# Patient Record
Sex: MALE | Race: WHITE | NOT HISPANIC OR LATINO | URBAN - METROPOLITAN AREA
[De-identification: names, ages, dates, MRNs, and addresses within clinical notes are randomized per-mention and may not be internally consistent; named-entity substitution may affect disease eponyms.]

---

## 2023-08-07 ENCOUNTER — EMERGENCY (EMERGENCY)
Facility: HOSPITAL | Age: 28
LOS: 1 days | Discharge: HOME | End: 2023-08-07
Admitting: EMERGENCY MEDICINE
Payer: COMMERCIAL

## 2023-08-07 VITALS
OXYGEN SATURATION: 98 % | SYSTOLIC BLOOD PRESSURE: 116 MMHG | DIASTOLIC BLOOD PRESSURE: 77 MMHG | RESPIRATION RATE: 16 BRPM | HEART RATE: 66 BPM | WEIGHT: 134.92 LBS | TEMPERATURE: 98 F | HEIGHT: 67 IN

## 2023-08-07 VITALS
SYSTOLIC BLOOD PRESSURE: 122 MMHG | HEART RATE: 68 BPM | OXYGEN SATURATION: 99 % | DIASTOLIC BLOOD PRESSURE: 86 MMHG | RESPIRATION RATE: 18 BRPM

## 2023-08-07 DIAGNOSIS — M79.18 MYALGIA, OTHER SITE: ICD-10-CM

## 2023-08-07 DIAGNOSIS — Z98.890 OTHER SPECIFIED POSTPROCEDURAL STATES: ICD-10-CM

## 2023-08-07 DIAGNOSIS — L02.31 CUTANEOUS ABSCESS OF BUTTOCK: ICD-10-CM

## 2023-08-07 LAB
ALBUMIN SERPL ELPH-MCNC: 4.4 G/DL — SIGNIFICANT CHANGE UP (ref 3.4–5)
ALP SERPL-CCNC: 74 U/L — SIGNIFICANT CHANGE UP (ref 40–120)
ALT FLD-CCNC: 24 U/L — SIGNIFICANT CHANGE UP (ref 12–42)
ANION GAP SERPL CALC-SCNC: 11 MMOL/L — SIGNIFICANT CHANGE UP (ref 9–16)
APPEARANCE UR: CLEAR — SIGNIFICANT CHANGE UP
AST SERPL-CCNC: 24 U/L — SIGNIFICANT CHANGE UP (ref 15–37)
BASOPHILS # BLD AUTO: 0.07 K/UL — SIGNIFICANT CHANGE UP (ref 0–0.2)
BASOPHILS NFR BLD AUTO: 0.6 % — SIGNIFICANT CHANGE UP (ref 0–2)
BILIRUB SERPL-MCNC: 1.7 MG/DL — HIGH (ref 0.2–1.2)
BILIRUB UR-MCNC: NEGATIVE — SIGNIFICANT CHANGE UP
BUN SERPL-MCNC: 16 MG/DL — SIGNIFICANT CHANGE UP (ref 7–23)
CALCIUM SERPL-MCNC: 9.1 MG/DL — SIGNIFICANT CHANGE UP (ref 8.5–10.5)
CHLORIDE SERPL-SCNC: 102 MMOL/L — SIGNIFICANT CHANGE UP (ref 96–108)
CO2 SERPL-SCNC: 25 MMOL/L — SIGNIFICANT CHANGE UP (ref 22–31)
COLOR SPEC: YELLOW — SIGNIFICANT CHANGE UP
CREAT SERPL-MCNC: 0.85 MG/DL — SIGNIFICANT CHANGE UP (ref 0.5–1.3)
DIFF PNL FLD: NEGATIVE — SIGNIFICANT CHANGE UP
EGFR: 121 ML/MIN/1.73M2 — SIGNIFICANT CHANGE UP
EOSINOPHIL # BLD AUTO: 0.06 K/UL — SIGNIFICANT CHANGE UP (ref 0–0.5)
EOSINOPHIL NFR BLD AUTO: 0.5 % — SIGNIFICANT CHANGE UP (ref 0–6)
GLUCOSE SERPL-MCNC: 95 MG/DL — SIGNIFICANT CHANGE UP (ref 70–99)
GLUCOSE UR QL: NEGATIVE MG/DL — SIGNIFICANT CHANGE UP
HCT VFR BLD CALC: 47.4 % — SIGNIFICANT CHANGE UP (ref 39–50)
HGB BLD-MCNC: 16.6 G/DL — SIGNIFICANT CHANGE UP (ref 13–17)
IMM GRANULOCYTES NFR BLD AUTO: 0.3 % — SIGNIFICANT CHANGE UP (ref 0–0.9)
KETONES UR-MCNC: ABNORMAL MG/DL
LACTATE BLDV-MCNC: 1.4 MMOL/L — SIGNIFICANT CHANGE UP (ref 0.5–2)
LACTATE BLDV-MCNC: 2.2 MMOL/L — HIGH (ref 0.5–2)
LEUKOCYTE ESTERASE UR-ACNC: NEGATIVE — SIGNIFICANT CHANGE UP
LYMPHOCYTES # BLD AUTO: 19.2 % — SIGNIFICANT CHANGE UP (ref 13–44)
LYMPHOCYTES # BLD AUTO: 2.27 K/UL — SIGNIFICANT CHANGE UP (ref 1–3.3)
MCHC RBC-ENTMCNC: 32.3 PG — SIGNIFICANT CHANGE UP (ref 27–34)
MCHC RBC-ENTMCNC: 35 GM/DL — SIGNIFICANT CHANGE UP (ref 32–36)
MCV RBC AUTO: 92.2 FL — SIGNIFICANT CHANGE UP (ref 80–100)
MONOCYTES # BLD AUTO: 0.9 K/UL — SIGNIFICANT CHANGE UP (ref 0–0.9)
MONOCYTES NFR BLD AUTO: 7.6 % — SIGNIFICANT CHANGE UP (ref 2–14)
NEUTROPHILS # BLD AUTO: 8.51 K/UL — HIGH (ref 1.8–7.4)
NEUTROPHILS NFR BLD AUTO: 71.8 % — SIGNIFICANT CHANGE UP (ref 43–77)
NITRITE UR-MCNC: NEGATIVE — SIGNIFICANT CHANGE UP
NRBC # BLD: 0 /100 WBCS — SIGNIFICANT CHANGE UP (ref 0–0)
PH UR: 8.5 (ref 5–8)
PLATELET # BLD AUTO: 224 K/UL — SIGNIFICANT CHANGE UP (ref 150–400)
POTASSIUM SERPL-MCNC: 3.8 MMOL/L — SIGNIFICANT CHANGE UP (ref 3.5–5.3)
POTASSIUM SERPL-SCNC: 3.8 MMOL/L — SIGNIFICANT CHANGE UP (ref 3.5–5.3)
PROT SERPL-MCNC: 7.6 G/DL — SIGNIFICANT CHANGE UP (ref 6.4–8.2)
PROT UR-MCNC: NEGATIVE MG/DL — SIGNIFICANT CHANGE UP
RBC # BLD: 5.14 M/UL — SIGNIFICANT CHANGE UP (ref 4.2–5.8)
RBC # FLD: 12.5 % — SIGNIFICANT CHANGE UP (ref 10.3–14.5)
SODIUM SERPL-SCNC: 138 MMOL/L — SIGNIFICANT CHANGE UP (ref 132–145)
SP GR SPEC: 1.01 — SIGNIFICANT CHANGE UP (ref 1–1.03)
UROBILINOGEN FLD QL: 1 MG/DL — SIGNIFICANT CHANGE UP (ref 0.2–1)
WBC # BLD: 11.85 K/UL — HIGH (ref 3.8–10.5)
WBC # FLD AUTO: 11.85 K/UL — HIGH (ref 3.8–10.5)

## 2023-08-07 PROCEDURE — 99285 EMERGENCY DEPT VISIT HI MDM: CPT | Mod: 25

## 2023-08-07 PROCEDURE — 10061 I&D ABSCESS COMP/MULTIPLE: CPT

## 2023-08-07 RX ORDER — CEPHALEXIN 500 MG
1 CAPSULE ORAL
Qty: 56 | Refills: 0
Start: 2023-08-07 | End: 2023-08-20

## 2023-08-07 RX ORDER — AMPICILLIN SODIUM AND SULBACTAM SODIUM 250; 125 MG/ML; MG/ML
3 INJECTION, POWDER, FOR SUSPENSION INTRAMUSCULAR; INTRAVENOUS ONCE
Refills: 0 | Status: COMPLETED | OUTPATIENT
Start: 2023-08-07 | End: 2023-08-07

## 2023-08-07 RX ORDER — SODIUM CHLORIDE 9 MG/ML
1000 INJECTION INTRAMUSCULAR; INTRAVENOUS; SUBCUTANEOUS ONCE
Refills: 0 | Status: COMPLETED | OUTPATIENT
Start: 2023-08-07 | End: 2023-08-07

## 2023-08-07 RX ORDER — ACETAMINOPHEN 500 MG
975 TABLET ORAL ONCE
Refills: 0 | Status: COMPLETED | OUTPATIENT
Start: 2023-08-07 | End: 2023-08-07

## 2023-08-07 RX ORDER — KETOROLAC TROMETHAMINE 30 MG/ML
15 SYRINGE (ML) INJECTION ONCE
Refills: 0 | Status: DISCONTINUED | OUTPATIENT
Start: 2023-08-07 | End: 2023-08-07

## 2023-08-07 RX ORDER — VANCOMYCIN HCL 1 G
1000 VIAL (EA) INTRAVENOUS ONCE
Refills: 0 | Status: COMPLETED | OUTPATIENT
Start: 2023-08-07 | End: 2023-08-07

## 2023-08-07 RX ADMIN — AMPICILLIN SODIUM AND SULBACTAM SODIUM 3 GRAM(S): 250; 125 INJECTION, POWDER, FOR SUSPENSION INTRAMUSCULAR; INTRAVENOUS at 12:47

## 2023-08-07 RX ADMIN — SODIUM CHLORIDE 1000 MILLILITER(S): 9 INJECTION INTRAMUSCULAR; INTRAVENOUS; SUBCUTANEOUS at 10:39

## 2023-08-07 RX ADMIN — Medication 250 MILLIGRAM(S): at 10:26

## 2023-08-07 RX ADMIN — SODIUM CHLORIDE 2000 MILLILITER(S): 9 INJECTION INTRAMUSCULAR; INTRAVENOUS; SUBCUTANEOUS at 09:24

## 2023-08-07 RX ADMIN — Medication 975 MILLIGRAM(S): at 12:41

## 2023-08-07 RX ADMIN — Medication 15 MILLIGRAM(S): at 12:47

## 2023-08-07 RX ADMIN — Medication 15 MILLIGRAM(S): at 09:24

## 2023-08-07 RX ADMIN — Medication 1000 MILLIGRAM(S): at 11:30

## 2023-08-07 RX ADMIN — Medication 975 MILLIGRAM(S): at 12:47

## 2023-08-07 RX ADMIN — SODIUM CHLORIDE 1000 MILLILITER(S): 9 INJECTION INTRAMUSCULAR; INTRAVENOUS; SUBCUTANEOUS at 12:47

## 2023-08-07 RX ADMIN — AMPICILLIN SODIUM AND SULBACTAM SODIUM 200 GRAM(S): 250; 125 INJECTION, POWDER, FOR SUSPENSION INTRAMUSCULAR; INTRAVENOUS at 09:29

## 2023-08-07 NOTE — ED ADULT NURSE NOTE - NSFALLUNIVINTERV_ED_ALL_ED
Bed/Stretcher in lowest position, wheels locked, appropriate side rails in place/Call bell, personal items and telephone in reach/Instruct patient to call for assistance before getting out of bed/chair/stretcher/Non-slip footwear applied when patient is off stretcher/[place] to call system/Physically safe environment - no spills, clutter or unnecessary equipment/Purposeful proactive rounding/Room/bathroom lighting operational, light cord in reach

## 2023-08-07 NOTE — ED ADULT NURSE NOTE - OBJECTIVE STATEMENT
Pt to ED for buttock pain x2 days. Redness and swelling to area, pt rating 10/10 pain. Unable to sit on bottom due to pain.

## 2023-08-07 NOTE — ED PROVIDER NOTE - CLINICAL SUMMARY MEDICAL DECISION MAKING FREE TEXT BOX
Patient presents with pain over buttock x one day. on exam well appearing, neurovascularly intact, with abscess over gluteal cleft. will check labs, do I&D, give abx and continue to monitor

## 2023-08-07 NOTE — ED PROCEDURE NOTE - CPROC ED INFORMED CONSENT1
Benefits, risks, and possible complications of procedure explained to patient/caregiver who verbalized understanding and gave verbal consent. Size Of Lesion In Cm: 0.2

## 2023-08-07 NOTE — ED PROVIDER NOTE - OBJECTIVE STATEMENT
PMHX lumbar spine surgery, on truvada for prep presents with buttock pain x 24hrs. denies fever, chills, nightsweats, hx of cellulitis in the past. states that he is an avid biking and thought that symptoms were due to irritation. denies pain on BM. last BM this morning.

## 2023-08-07 NOTE — ED PROVIDER NOTE - PATIENT PORTAL LINK FT
You can access the FollowMyHealth Patient Portal offered by [facility] by registering at the following website: [email or web address]. By joining [employer]’s FollowMyHealth portal, you will also be able to view your health information using other applications (apps) compatible with our system.

## 2023-08-07 NOTE — ED PROVIDER NOTE - NSFOLLOWUPINSTRUCTIONS_ED_ALL_ED_FT
Cellulitis    Cellulitis is a skin infection caused by bacteria. This condition occurs most often in the arms and lower legs but can occur anywhere over the body. Symptoms include redness, swelling, warm skin, tenderness, and chills/fever. If you were prescribed an antibiotic medicine, take it as told by your health care provider. Do not stop taking the antibiotic even if you start to feel better.    SEEK IMMEDIATE MEDICAL CARE IF YOU HAVE ANY OF THE FOLLOWING SYMPTOMS: worsening fever, red streaks coming from affected area, vomiting or diarrhea, or dizziness/lightheadedness.  Abscess    An abscess is an infected area that contains a collection of pus and debris. It can occur in almost any part of the body and occurs when the tissue gets infection. Symptoms include a painful mass that is red, warm, tender that might break open and HAVE drainage. If your health care provider gave you antibiotics make sure to take the full course and do not stop even if feeling better.     SEEK IMMEDIATE MEDICAL CARE IF YOU HAVE ANY OF THE FOLLOWING SYMPTOMS: chills, fever, muscle aches, or red streaking from the area.

## 2023-08-07 NOTE — ED PROCEDURE NOTE - CPROC ED POST PROC CARE GUIDE1
10-Nov-2021 21:26
Verbal/written post procedure instructions were given to patient/caregiver./Instructed patient/caregiver to follow-up with primary care physician./Instructed patient/caregiver regarding signs and symptoms of infection./Keep the cast/splint/dressing clean and dry.

## 2023-08-09 LAB
CULTURE RESULTS: NO GROWTH — SIGNIFICANT CHANGE UP
CULTURE RESULTS: SIGNIFICANT CHANGE UP
SPECIMEN SOURCE: SIGNIFICANT CHANGE UP
SPECIMEN SOURCE: SIGNIFICANT CHANGE UP

## 2023-08-12 LAB
CULTURE RESULTS: SIGNIFICANT CHANGE UP
CULTURE RESULTS: SIGNIFICANT CHANGE UP
SPECIMEN SOURCE: SIGNIFICANT CHANGE UP
SPECIMEN SOURCE: SIGNIFICANT CHANGE UP

## 2024-07-23 ENCOUNTER — EMERGENCY (EMERGENCY)
Facility: HOSPITAL | Age: 29
LOS: 1 days | Discharge: HOME | End: 2024-07-23
Attending: EMERGENCY MEDICINE | Admitting: EMERGENCY MEDICINE
Payer: COMMERCIAL

## 2024-07-23 VITALS
SYSTOLIC BLOOD PRESSURE: 158 MMHG | OXYGEN SATURATION: 100 % | DIASTOLIC BLOOD PRESSURE: 93 MMHG | HEIGHT: 68 IN | HEART RATE: 98 BPM | RESPIRATION RATE: 20 BRPM | WEIGHT: 154.98 LBS

## 2024-07-23 DIAGNOSIS — L05.91 PILONIDAL CYST WITHOUT ABSCESS: ICD-10-CM

## 2024-07-23 PROCEDURE — 10081 I&D PILONIDAL CYST COMP: CPT

## 2024-07-23 PROCEDURE — 99284 EMERGENCY DEPT VISIT MOD MDM: CPT | Mod: 25

## 2024-07-23 RX ORDER — SULFAMETHOXAZOLE AND TRIMETHOPRIM 800; 160 MG/1; MG/1
1 TABLET ORAL
Qty: 14 | Refills: 0
Start: 2024-07-23 | End: 2024-07-29

## 2024-07-23 RX ORDER — SULFAMETHOXAZOLE AND TRIMETHOPRIM 800; 160 MG/1; MG/1
1 TABLET ORAL ONCE
Refills: 0 | Status: COMPLETED | OUTPATIENT
Start: 2024-07-23 | End: 2024-07-23

## 2024-07-23 RX ORDER — CEPHALEXIN 500 MG
1 CAPSULE ORAL
Qty: 28 | Refills: 0
Start: 2024-07-23 | End: 2024-07-29

## 2024-07-23 RX ORDER — CEPHALEXIN 500 MG
500 CAPSULE ORAL ONCE
Refills: 0 | Status: COMPLETED | OUTPATIENT
Start: 2024-07-23 | End: 2024-07-23

## 2024-07-23 RX ADMIN — Medication 500 MILLIGRAM(S): at 18:32

## 2024-07-23 RX ADMIN — SULFAMETHOXAZOLE AND TRIMETHOPRIM 1 TABLET(S): 800; 160 TABLET ORAL at 18:32

## 2024-07-23 RX ADMIN — Medication 600 MILLIGRAM(S): at 18:32

## 2024-07-23 NOTE — ED PROVIDER NOTE - CARE PROVIDER_API CALL
Rosaline Rene  Surgery  [address], [address]  [place] [address]  Phone: [phone]  Fax: [phone]  Follow Up Time:     Feliz Martins  Colon/Rectal Surgery  [address] [facility]  [place] [address]  Phone: [phone]  Fax: [phone]  Follow Up Time:     Benitez Cadena  Surgery  [address]  [place] [address]  Phone: [phone]  Fax: [phone]  Follow Up Time:    No

## 2024-07-23 NOTE — ED PROVIDER NOTE - PHYSICAL EXAMINATION
well healed scar at top of buttock crease on left, moderate tenderess and fullness to palpation where scar is  no anal/perianal tender or redness  Arsenio buenrostro as chaperone

## 2024-07-23 NOTE — ED PROVIDER NOTE - OBJECTIVE STATEMENT
Subjective:  - Summary : The patient visited me due to a lump and pain that he had been feeling at the same location where he had a pilonidal cyst a year ago. The patient is worried that the cyst has returned.  - Chief Complaint (CC) : Concerns about a possible recurrence of pilonidal cyst.  - History of Present Illness (HPI) : The patient had a pilonidal cyst that required incision and drainage exactly a year ago. He now presents with the feeling of a lump in the same spot of previous cyst. The area is not inflamed but he is experiencing pain similar to what he felt last year.  - Past Medical History : The patient has a history of a pilonidal cyst that was treated with an incision and drainage. He has also had L4, L5 spinal surgery, and knee surgery. He reports history of sciatica and scoliosis as well.  - Past Surgical History : In the past, the patient underwent L4, L5 spinal surgery, knee surgery, and incision and drainage of a pilonidal cyst.  - Family History : Family history was not reviewed during this visit.  - Social History : The patient does not smoke.  - Review of Systems : Not fully explored during the visit. The patient denied any respiratory issues.  - Medications : No regular medications reported by the patient.  - Allergies : No known allergies were reported by the patient.  Objective:  - Diagnostic Results : None discussed during the visit.  - Vital Signs : Not mentioned during visit.  - Physical Examination (PE) : Upon examination, the area the patient identified as painful is found to be minimally inflamed.  Assessment:  - Summary : Given the similar location and symptoms to the patient's previous pilonidal cyst, there is a possibility that the cyst has recurred.  - Problems :  - Possible recurrence of pilonidal cyst    - Differential Diagnosis :  - Pilonidal cyst recurrence    - Skin abscess    - Infected ingrown hair    - Furuncle or Carbuncle    Plan:  - Summary : The patient was offered different options, including administration of antibiotics, a minor incision with potential drainage of the area, or a referral to a surgeon for potential surgical intervention.  - Plan :  - Prescribe antibiotics    - Possible minor incision and drainage    - Make an appointment for the patient to see a surgeon in the next couple of weeks    - Advise the patient to return if the condition does not improve or worsens

## 2024-07-23 NOTE — ED PROVIDER NOTE - NSFOLLOWUPINSTRUCTIONS_ED_ALL_ED_FT
Follow up here in the ER in 1-2 days for dressing change and packing removal.  Take the antibiotics as prescribed.  Make an appointment with the surgeon to evaluate the cyst area for definitive management.  Ibuprofen 600mg every 6 hours as needed for soreness or pain.  Return at any time if you have any concerns.      No work for three days.    Pilonidal Cyst Drainage, Care After  The following information offers guidance on how to care for yourself after your procedure. Your health care provider may also give you more specific instructions. If you have problems or questions, contact your health care provider.    What can I expect after the procedure?  After the procedure, it is common to have:  Pain that gets better when you take medicine.  Some fluid or blood coming from your wound.  Follow these instructions at home:  Medicines    Take over-the-counter and prescription medicines only as told by your health care provider.  If you were prescribed antibiotics, take them as told by your health care provider. Do not stop using the antibiotic even if you start to feel better.  Ask your health care provider if the medicine prescribed to you:  Requires you to avoid driving or using machinery.  Can cause constipation. You may need to take these actions to prevent or treat constipation:  Drink enough fluid to keep your urine pale yellow.  Take over-the-counter or prescription medicines.  Eat foods that are high in fiber, such as beans, whole grains, and fresh fruits and vegetables.  Limit foods that are high in fat and processed sugars, such as fried or sweet foods.  Bathing    Do not take baths or showers, swim, or use a hot tub until your health care provider approves. You may only be allowed to take sponge baths.  When you can return to bathing will depend on the type of wound that you have.  If your wound was packed with a germ-free packing material, keep the area dry until your packing has been removed. After the packing has been removed, you may start taking showers when your health care provider approves.  If the edges of the incision around your wound were stitched to your skin (marsupialization), you may start taking showers the day after surgery, or when your health care provider approves. Let the water from the shower moisten your bandage (dressing). This will make it easier to take off. Remove your dressing before you shower.  While bathing, clean your buttocks area gently with soap and water.  After bathing:  Pat the area dry with a soft, clean towel.  If directed, cover the area with a clean dressing.  Wound care    Two stitched wounds. One is normal. The other is red with pus and infected.  You may need to have a caregiver help you with wound care and dressing changes.  Follow instructions from your health care provider about how to take care of your wound. Make sure you:  Wash your hands with soap and water for at least 20 seconds before and after you change your dressing. If soap and water are not available, use hand .  Change your dressing as told by your health care provider.  Leave stitches (sutures), skin glue, or adhesive strips in place. These skin closures may need to stay in place for 2 weeks or longer. If adhesive strip edges start to loosen and curl up, you may trim the loose edges. Do not remove adhesive strips completely unless your health care provider tells you to do that.  Check your wound every day for signs of infection. Check for:  Redness, swelling, or more pain.  More fluid or blood.  Warmth.  Pus or a bad smell.  Follow any additional instructions from your health care provider on how to care for your wound, such as wound cleaning, wound flushing (irrigation), or packing your wound with a dressing.  If you had marsupialization, ask your health care provider when you can stop using a dressing.  Lifestyle    Do not do activities that irritate or put pressure on your buttocks for about 2 weeks, or as long as told by your health care provider. These activities include bike riding, running, and anything that involves a twisting motion.  Rest as told by your health care provider.  Do not sit for a long time without moving. Get up to take short walks every 1–2 hours. This will improve blood flow and breathing. Ask for help if you feel weak or unsteady.  Sleep on your side instead of your back.  Avoid wearing tight underwear and tight pants.  Return to your normal activities as told by your health care provider. Ask your health care provider what activities are safe for you.  General instructions    Do not use any products that contain nicotine or tobacco. These products include cigarettes, chewing tobacco, and vaping devices, such as e-cigarettes. These can delay wound healing after surgery. If you need help quitting, ask your health care provider.  Keep all follow-up visits. This is important to monitor healing. If you had an incision and drainage procedure with wound packing, your packing may be changed or removed at follow-up visits.  Contact a health care provider if:  You have pain that does not get better with medicine.  You have any of these signs of infection:  More redness, swelling, or pain around your incision.  More fluid or blood coming from your incision.  Warmth coming from your incision.  Pus or a bad smell coming from your incision.  A fever.  You have muscle aches.  You feel generally sick.  You are dizzy.  Summary  A pilonidal cyst is a fluid-filled sac that forms under the skin near the tailbone. It is common to have some fluid or blood coming from your wound after a procedure to drain a pilonidal cyst.  If you were prescribed antibiotics, take them as told by your health care provider. Do not stop taking the antibiotic even if you start to feel better.  You may need to have a caregiver help you with wound care and dressing changes.  Return to your health care provider as instructed to have any packing material changed or removed.

## 2024-07-23 NOTE — ED PROVIDER NOTE - PROVIDER TOKENS
PROVIDER:[TOKEN:[[id number]]],PROVIDER:[TOKEN:[[id number]]],PROVIDER:[TOKEN:[[id number]]]

## 2024-07-23 NOTE — ED PROVIDER NOTE - PATIENT PORTAL LINK FT
You can access the FollowMyHealth Patient Portal offered by [facility] by registering at the following website: [email or web address]. By joining [employer]’s FollowMyHealth portal, you will also be able to view your health information using other applications (apps) compatible with our system.

## 2024-07-23 NOTE — ED PROVIDER NOTE - CARE PROVIDERS DIRECT ADDRESSES
[email or web address]

## 2024-07-25 ENCOUNTER — EMERGENCY (EMERGENCY)
Age: 29
LOS: 1 days | Discharge: HOME | End: 2024-07-25
Attending: EMERGENCY MEDICINE | Admitting: EMERGENCY MEDICINE
Payer: COMMERCIAL

## 2024-07-25 VITALS
RESPIRATION RATE: 15 BRPM | TEMPERATURE: 98 F | OXYGEN SATURATION: 99 % | HEIGHT: 68 IN | HEART RATE: 61 BPM | DIASTOLIC BLOOD PRESSURE: 83 MMHG | SYSTOLIC BLOOD PRESSURE: 126 MMHG

## 2024-07-25 DIAGNOSIS — L05.91 PILONIDAL CYST WITHOUT ABSCESS: ICD-10-CM

## 2024-07-25 PROBLEM — Z00.00 ENCOUNTER FOR PREVENTIVE HEALTH EXAMINATION: Status: ACTIVE | Noted: 2024-07-25

## 2024-07-25 PROCEDURE — L9995: CPT

## 2024-07-26 ENCOUNTER — NON-APPOINTMENT (OUTPATIENT)
Age: 29
End: 2024-07-26

## 2024-07-30 ENCOUNTER — APPOINTMENT (OUTPATIENT)
Dept: COLORECTAL SURGERY | Facility: CLINIC | Age: 29
End: 2024-07-30
Payer: COMMERCIAL

## 2024-07-30 ENCOUNTER — NON-APPOINTMENT (OUTPATIENT)
Age: 29
End: 2024-07-30

## 2024-07-30 VITALS
TEMPERATURE: 97.8 F | BODY MASS INDEX: 20.46 KG/M2 | DIASTOLIC BLOOD PRESSURE: 70 MMHG | WEIGHT: 135 LBS | HEIGHT: 68 IN | SYSTOLIC BLOOD PRESSURE: 111 MMHG | HEART RATE: 57 BPM

## 2024-07-30 DIAGNOSIS — Z78.9 OTHER SPECIFIED HEALTH STATUS: ICD-10-CM

## 2024-07-30 DIAGNOSIS — L05.91 PILONIDAL CYST W/OUT ABSCESS: ICD-10-CM

## 2024-07-30 PROCEDURE — 99203 OFFICE O/P NEW LOW 30 MIN: CPT | Mod: 57

## 2024-07-30 NOTE — PHYSICAL EXAM
[Abdomen Tenderness] : ~T No ~M abdominal tenderness [JVD] : no jugular venous distention  [Normal Breath Sounds] : Normal breath sounds [Alert] : alert [Calm] : calm [de-identified] : Well appearing male in NAD [de-identified] : MMM [de-identified] : ROM WNL [FreeTextEntry1] : The pt was examined in the left lateral decubitus position with a medical assistant present for the entirety of the examination. Visual examination of the intergluteal cleft with effacement of the buttocks revealed a midline superior chronic noninflamed pit without hair emanating from it, and associated left superior medial surgical drainage site that was well healing roughly 2cm superior to the pit without evidence of active infection. Otherwise no masses, ulcerations, fissures or skin rashes.  The patient tolerated the exam well.

## 2024-07-30 NOTE — PLAN
.  History and Physical Exam    PCP: Ty Gilliland MD     Source of Information: chart review  Living: senior residence    HISTORY     Chief Complaint: Santa Rosales is a 66year old male presenting with PMHx of 3-vessel CAD s/p stent placement (RCA, LAD in Nov/Dec 2019), GERD, prostate cancer, and dementia presents as a transfer from [address] [place] [address] due to insurance coverage issues. History is solely based on chart review. Patient not comprehending questions. Patient admitted on 11/7/2020 to [facility] after being found in his senior building after 3 days of not being heard from. [employer] broke into his room and the patient was found in his own feces and urine. There were no signs of trauma, alcohol or recreational drug use. He was found in a very confused state, unable to answer any questions. Upon arrival in the ED at [address] [place] [address] patient was afebrile, normotensive and maintaining saturations on room air. He was only oriented to self. He could not answer any questions. Extensive lab work-up was done for acute confusional state, did not reveal any cause for AMS. Patient treated with vancomycin and ceftriaxone as WBC were elevated. At that time troponin was also found to be elevated at 0.11 and down trended to 0.08. NM scan was done which showed reversible inferior infarct, no wall motion abnormalities. Of note patient was previously hospitalized here in October for altered mental status, at that time work-up was done changing and change in mental status attributed to worsening dementia.     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   â¢ Arthritis    â¢ Atypical chest pain 12/20/2018   â¢ Colon cancer screening 1/29/2019   â¢ Constipation 10/20/2017   â¢ Diarrhea 4/23/2019   â¢ Frequency of urination 7/27/2019   â¢ Gout    â¢ Hypothyroidism 7/23/2013   â¢ Male erectile disorder 4/7/2010   â¢ Malignant neoplasm of prostate (CMS/HCC) 5/5/2014   â¢ Nose discharge 3/26/2019   â¢ Prostate cancer (CMS/Formerly [facility])    â¢ Prostate cancer (CMS/Formerly [facility])    â¢ Recurrent major depressive disorder (CMS/Formerly [facility]) 12/20/2018   â¢ Urinary tract infection without hematuria 4/23/2019   â¢ Ventricular tachycardia (CMS/[facility]ly [facility]) 1/23/2019        Surgical History  Past Surgical History:   Procedure Laterality Date   â¢ Cardiac catherization     â¢ Knee surgery     â¢ No past surgeries          Social History  Social History     Tobacco Use   â¢ Smoking status: Former Smoker     Packs/day: 0.00   â¢ Smokeless tobacco: Former User   â¢ Tobacco comment: not sure when he quit, started smoking when he was 13, 0.5 pack a day   Substance Use Topics   â¢ Alcohol use: No     Frequency: Never   â¢ Drug use: No       Family History  No family history on file. Allergies  ALLERGIES:  Lisinopril    Home Meds  Medications Prior to Admission   Medication Sig Dispense Refill   â¢ celecoxib (CeleBREX) 100 MG capsule Take 100 mg by mouth daily. â¢ mirtazapine (REMERON) 15 MG tablet Take 15 mg by mouth nightly. â¢ isosorbide mononitrate (IMDUR) 60 MG 24 hr tablet Take 2 tablets by mouth daily. (Patient taking differently: Take 60 mg by mouth 2 times daily. ) 90 tablet 3   â¢ atorvastatin (LIPITOR) 80 MG tablet Take 1 tablet by mouth daily. 90 tablet 3   â¢ clopidogrel (PLAVIX) 75 MG tablet Take 1 tablet by mouth daily. 90 tablet 3   â¢ donepezil (ARICEPT) 10 MG tablet Take 1 tablet by mouth daily. (Patient taking differently: Take 10 mg by mouth nightly. ) 90 tablet 6   â¢ allopurinol (ZYLOPRIM) 300 MG tablet TAKE 1 TABLET BY MOUTH DAILY 90 tablet 1   â¢ fluticasone (FLONASE) 50 MCG/ACT nasal spray SHAKE LQ AND U 1 SPR IEN D     â¢ ranolazine (RANEXA) 500 MG 12 hr tablet Take 500 mg by mouth 2 times daily. â¢ DULoxetine (CYMBALTA) 30 MG capsule Take 1 capsule by mouth daily. 30 capsule 0   â¢ memantine (NAMENDA) 5 MG tablet Take 5 mg by mouth 2 times daily. â¢ azelastine (ASTELIN) 0.1 % nasal spray Spray 1 spray in each nostril 2 times daily. Use in each nostril as directed 30 mL 2   â¢ pantoprazole (PROTONIX) 40 MG tablet Take 1 tablet by mouth daily. 90 tablet 0   â¢ Cholecalciferol (VITAMIN D) 125 MCG (5000 UT) Cap Take 5,000 Units by mouth daily. 30 capsule 0   â¢ albuterol-ipratropium (COMBIVENT RESPIMAT) 100-20 MCG/ACT inhaler Inhale 1 puff into the lungs every 4 hours as needed for Shortness of Breath. (Patient taking differently: Inhale 2 puffs into the lungs 2 times daily. ) 4 g 12   â¢ aspirin 81 MG chewable tablet Chew 1 tablet by mouth daily. 90 tablet 3   â¢ nitroGLYcerin (NITROSTAT) 0.4 MG sublingual tablet Place 1 tablet under the tongue every 5 minutes as needed for Chest pain. 90 tablet 3   â¢  MG capsule TAKE ONE CAPSULE BY MOUTH TWICE DAILY 30 capsule 0   â¢ albuterol 108 (90 Base) MCG/ACT inhaler Inhale 2 puffs into the lungs every 4 hours as needed. Inpatient Meds  Current Facility-Administered Medications   Medication Dose Route Frequency Provider Last Rate Last Dose   â¢ influenza virus quadrivalent vaccine inactivated (PRESERVATIVE FREE) injection 0.5 mL  0.5 mL Intramuscular Once Hansel Manuel RN              OBJECTIVE      VITAL SIGNS:     Vital Last Value 24 Hour Range   Temperature 97.9 Â°F (36.6 Â°C) (11/17/20 2322) Temp  Min: 97.9 Â°F (36.6 Â°C)  Max: 98.8 Â°F (37.1 Â°C)   Pulse 77 (11/17/20 2322) Pulse  Min: 77  Max: 82   Respiratory 16 (11/17/20 2322) Resp  Min: 16  Max: 18   Non-Invasive  Blood Pressure 134/70 (11/17/20 2322) BP  Min: 134/70  Max: 154/91   Pulse Oximetry 97 % (11/17/20 2322) SpO2  Min: 97 %  Max: 98 %         PHYSICAL EXAM    Physical Exam   Constitutional: He is well-developed, well-nourished, and in no distress. HENT:   Head: Normocephalic and atraumatic. Neck: Normal range of motion.    Cardiovascular: Normal rate and regular rhythm. Pulmonary/Chest: Effort normal and breath sounds normal.   Abdominal: Soft. Bowel sounds are normal. There is no abdominal tenderness. Neurological: He is alert. Oriented to self only. Skin: Skin is warm. Psychiatric: Mood and affect normal.        Labs   [facility] Results:    Negative for influenza A and B  MRSA screen negative  Wound culture from left leg grew few Staphylococcus coag negative  Urine culture no growth  Blood culture no growth x2  COVID-19 PCR negative    B12 more than 1500 folate 740  PATRICA screen negative  RPR nonreactive  ESR 90 (11/10)  Ammonia (11/10) 13    Lipid panel 11/10  Cholesterol 99    HDL 12  LDL 74    HbA1c 5.8  TSH 3.36    11/7/20  WBC 11.9, Hb 12.2, platelet 558, Cr 1.7  11/16/20 CBC: WBC 8.6, Hb 10.9, Plt 481. Cr 0.9    Troponin 0.1 11/7, down trended to 0.08 the following day  CK 1432 on 11/7, following day 1408    EKG 11/7/20 sinus rhythm with nonspecific ST changes, occasional PACs, occasional PVCs    Chest x-ray 11/7/2020 no acute intrathoracic process. CT scan of abdomen and pelvis 11/8/2020 nonobstructive small stone in left kidney, degenerative changes of the lumbar spine, no acute process. CT chest without contrast 11/7/2020 diffuse multifocal calcified coronary atherosclerotic disease, large coarse calcified right hilar adenopathy. Mild dilation of ascending thoracic aorta. CT head without contrast 11/7/2020 mild diffuse periventricular hypodense chronic ischemic changes, no acute intracranial process    NM scan 11/13/2020 focal area of reversible ischemia in the inferior wall, no other abnormalities seen in the radionucleotide myocardial technetium scan. Cardiac wall motion within normal limits. EF 74%.     ASSESSMENT AND PLAN   Mr. Bhavya Barajas is a 67 y/o M with a PMHx of 3-vessel CAD s/p stent placement (RCA, LAD in Nov/Dec 2019), GERD, prostate cancer, and dementia    #Altered mental status   -Likely 2/2 worsening senile dementia  -Upon my assessment patient was AO x1, not altered, oriented to self. -Vitally stable, HR 77, /70, saturating well on RA  -O/E grossly stable  -Labs: WBC 7.8, Hb 10.4, Plt 373   -Outside workup negative for infectious etiology, PATRICA not detected  -Imaging including CXR, CT head/chest/abdomen with no acute process  -Received vancomycin and ceftriaxone at [facility] but unsure of how many doses  Plan:  -Cont donepezil 10 mg PO daily, memantine 5 mg PO BID, and mirtazapine 15 mg PO daily   -Will obtain PT and OT evaluation for adequate disposition   -Speech therapy for swallow evaluation, NPO till evaluated  -CTM    #Troponinemia [resolved]  -11/7 troponin 0.11, downtrended to 0.08   -Troponin today <0.02  -EKG 11/7 normal sinus rhythm  -NM scan 11/13/20 shows focal area of reversible ischemia in the inferior wall, no other abnormalities seen in the radionucleotide myocardial technetium scan. Cardiac wall motion within normal limits. EF 74%. -F/u EKG  - Cardiology consulted, appreciate recs    #JAMIE on CKD [resovled]   -Creatinine: 1.22, at baseline  -11/7 Cr 1.7, today 0.91  Â   #3-vessel Coronary artery disease s/p stent placement  -Stents placed in RCA and LAD in Nov/Dec 2019  -Resume Home medications: imdur 60 mg BID, aspirin 81 mg PO daily, and -clopidogrel 75 mg PO daily Â   Â   #Gout  -Continue home allopurinol 300 mg PO daily  Â   #Asthma  -Cont albuterol inhaler  Â   #GERD  -Continue home pantoprazole 40 mg   Â   DVT/GI PPx: SCDs/pantoprazole    F: No IVF  E: Replete PRN  N: NPO    Code Status: Full Code    Dispo: tele  Isolation: none  Consults: cardiology    Discussed and seen with FANNY Jeffries M.D.   Internal Medicine PGY1  (Please contact via [employer]) [TextEntry] : - I discussed with the patient the risks, benefits and alternatives to surgical management of a pilonidal cyst and the patient wished to proceed with a minimally invasive pilonidal cystic debridement.

## 2024-07-30 NOTE — HISTORY OF PRESENT ILLNESS
[FreeTextEntry1] : 28M presenting with concern for a recently recurrent pilonidal cyst. He reports his first episode one year prior requiring urgent ED evaluation and drainage. He recovered well from that episode however recently developed a recurrence in the exact same location requiring another trip to [facility] last week and was drained in the same spot. He has since been on Keflex and Bactrim and reports no further drainage, pain has resolved and no further bleeding.  PMH: Denies Meds: Truvada for Prep All: NKDA PSH: Back and knee surgery FH: Denies CRC/IBD Cscope: Never

## 2024-08-19 PROBLEM — M54.30 SCIATICA, UNSPECIFIED SIDE: Chronic | Status: ACTIVE | Noted: 2024-07-25

## 2024-08-19 PROBLEM — M41.9 SCOLIOSIS, UNSPECIFIED: Chronic | Status: ACTIVE | Noted: 2024-07-25

## 2024-09-05 ENCOUNTER — TRANSCRIPTION ENCOUNTER (OUTPATIENT)
Age: 29
End: 2024-09-05

## 2024-09-05 NOTE — ASU PATIENT PROFILE, ADULT - NSICDXPASTMEDICALHX_GEN_ALL_CORE_FT
PAST MEDICAL HISTORY:  Pilonidal cyst     Sciatica     Scoliosis      PAST MEDICAL HISTORY:  Anxiety     Pilonidal cyst     Sciatica     Scoliosis

## 2024-09-05 NOTE — ASU PATIENT PROFILE, ADULT - FALL HARM RISK - UNIVERSAL INTERVENTIONS
Bed in lowest position, wheels locked, appropriate side rails in place/Call bell, personal items and telephone in reach/Instruct patient to call for assistance before getting out of bed or chair/Non-slip footwear when patient is out of bed/[place] to call system/Physically safe environment - no spills, clutter or unnecessary equipment/Purposeful Proactive Rounding/Room/bathroom lighting operational, light cord in reach

## 2024-09-05 NOTE — ASU PATIENT PROFILE, ADULT - NSICDXPASTSURGICALHX_GEN_ALL_CORE_FT
PAST SURGICAL HISTORY:  History of back surgery      PAST SURGICAL HISTORY:  History of back surgery l4 l5 2012    S/P ACL repair x5

## 2024-09-06 ENCOUNTER — OUTPATIENT (OUTPATIENT)
Dept: OUTPATIENT SERVICES | Facility: HOSPITAL | Age: 29
LOS: 1 days | Discharge: HOME | End: 2024-09-06
Payer: COMMERCIAL

## 2024-09-06 ENCOUNTER — TRANSCRIPTION ENCOUNTER (OUTPATIENT)
Age: 29
End: 2024-09-06

## 2024-09-06 ENCOUNTER — RESULT REVIEW (OUTPATIENT)
Age: 29
End: 2024-09-06

## 2024-09-06 ENCOUNTER — APPOINTMENT (OUTPATIENT)
Dept: COLORECTAL SURGERY | Facility: CLINIC | Age: 29
End: 2024-09-06

## 2024-09-06 VITALS
DIASTOLIC BLOOD PRESSURE: 73 MMHG | TEMPERATURE: 97 F | HEART RATE: 59 BPM | RESPIRATION RATE: 16 BRPM | SYSTOLIC BLOOD PRESSURE: 116 MMHG | OXYGEN SATURATION: 100 %

## 2024-09-06 VITALS
WEIGHT: 133.16 LBS | HEART RATE: 70 BPM | TEMPERATURE: 99 F | OXYGEN SATURATION: 98 % | RESPIRATION RATE: 16 BRPM | HEIGHT: 67 IN | DIASTOLIC BLOOD PRESSURE: 66 MMHG | SYSTOLIC BLOOD PRESSURE: 125 MMHG

## 2024-09-06 DIAGNOSIS — Z98.890 OTHER SPECIFIED POSTPROCEDURAL STATES: Chronic | ICD-10-CM

## 2024-09-06 PROCEDURE — 88304 TISSUE EXAM BY PATHOLOGIST: CPT | Mod: 26

## 2024-09-06 PROCEDURE — 11772 EXC PILONIDAL CYST COMP: CPT | Mod: GC

## 2024-09-06 DEVICE — SURGICEL FIBRILLAR 1 X 2": Type: IMPLANTABLE DEVICE | Status: FUNCTIONAL

## 2024-09-06 RX ORDER — OXYCODONE HYDROCHLORIDE 5 MG/1
1 TABLET ORAL
Qty: 5 | Refills: 0
Start: 2024-09-06

## 2024-09-06 RX ORDER — ONDANSETRON 2 MG/ML
4 INJECTION, SOLUTION INTRAMUSCULAR; INTRAVENOUS ONCE
Refills: 0 | Status: DISCONTINUED | OUTPATIENT
Start: 2024-09-06 | End: 2024-09-06

## 2024-09-06 RX ORDER — HYDROMORPHONE HYDROCHLORIDE 2 MG/1
0.2 TABLET ORAL
Refills: 0 | Status: DISCONTINUED | OUTPATIENT
Start: 2024-09-06 | End: 2024-09-06

## 2024-09-06 RX ORDER — EMTRICITABINE AND TENOFOVIR DISOPROXIL FUMARATE 200; 300 MG/1; MG/1
1 TABLET, FILM COATED ORAL
Refills: 0 | DISCHARGE

## 2024-09-06 RX ORDER — OXYCODONE HYDROCHLORIDE 5 MG/1
5 TABLET ORAL ONCE
Refills: 0 | Status: DISCONTINUED | OUTPATIENT
Start: 2024-09-06 | End: 2024-09-06

## 2024-09-06 RX ORDER — FENTANYL CITRATE 50 UG/ML
25 INJECTION INTRAMUSCULAR; INTRAVENOUS
Refills: 0 | Status: DISCONTINUED | OUTPATIENT
Start: 2024-09-06 | End: 2024-09-06

## 2024-09-06 RX ORDER — CRANBERRY FRUIT EXTRACT 650 MG
0 CAPSULE ORAL
Refills: 0 | DISCHARGE

## 2024-09-06 NOTE — ASU DISCHARGE PLAN (ADULT/PEDIATRIC) - CARE PROVIDER_API CALL
Feliz Martins  Colon/Rectal Surgery  [facility]  [place] [address]  Phone: [phone]  Fax: [phone]  Follow Up Time: 1 month

## 2024-09-06 NOTE — BRIEF OPERATIVE NOTE - NSICDXBRIEFOPLAUNCH_GEN_ALL_CORE
<--- Click to Launch ICDx for PreOp, PostOp and Procedure
(3) unable to understand (not related to language barrier)

## 2024-09-06 NOTE — ASU DISCHARGE PLAN (ADULT/PEDIATRIC) - ASU DC SPECIAL INSTRUCTIONSFT
-Avoiding straining with BM’s and maintaining adequate hydration and fiber intake  -Drink 6-10 glasses of water daily and eat a mix of fruits, vegetables and whole grains  -Can take fiber supplement in addition to dietary fiber but only provides 1/3 (10mg) of needed daily intake  -Please call the office to schedule an appointment 3-4 weeks after surgery. The office number is listed below.

## 2024-09-06 NOTE — BRIEF OPERATIVE NOTE - OPERATION/FINDINGS
GIPS procedure. 2 punch 8mm, cavity debrided and irrigated GIPS procedure. 2 punch 5mm, cavity debrided and irrigated

## 2024-09-06 NOTE — ASU DISCHARGE PLAN (ADULT/PEDIATRIC) - NS MD DC FALL RISK RISK
For information on Fall & Injury Prevention, visit: [email or web address] OR  [email or web address] OR  https://www.cdc.gov/steadi/patient.html

## 2024-09-06 NOTE — ASU PREOP CHECKLIST - HAIR REMOVAL
hair removal not indicated Quality 402: Tobacco Use And Help With Quitting Among Adolescents: Patient screened for tobacco and never smoked Quality 110: Preventive Care And Screening: Influenza Immunization: Influenza Immunization Administered during Influenza season Detail Level: Zone

## 2024-09-11 LAB — SURGICAL PATHOLOGY STUDY: SIGNIFICANT CHANGE UP

## 2024-09-16 PROBLEM — F41.9 ANXIETY DISORDER, UNSPECIFIED: Chronic | Status: ACTIVE | Noted: 2024-09-06

## 2024-09-17 ENCOUNTER — APPOINTMENT (OUTPATIENT)
Dept: COLORECTAL SURGERY | Facility: CLINIC | Age: 29
End: 2024-09-17
Payer: COMMERCIAL

## 2024-09-17 VITALS
WEIGHT: 133 LBS | HEART RATE: 70 BPM | HEIGHT: 68 IN | SYSTOLIC BLOOD PRESSURE: 99 MMHG | DIASTOLIC BLOOD PRESSURE: 65 MMHG | BODY MASS INDEX: 20.16 KG/M2 | TEMPERATURE: 98.3 F

## 2024-09-17 DIAGNOSIS — L05.91 PILONIDAL CYST W/OUT ABSCESS: ICD-10-CM

## 2024-09-17 PROCEDURE — 99024 POSTOP FOLLOW-UP VISIT: CPT

## 2024-09-17 RX ORDER — OXYCODONE HYDROCHLORIDE 5 MG/1
1 TABLET ORAL
Qty: 10 | Refills: 0
Start: 2024-09-17

## 2024-09-17 RX ORDER — CEPHALEXIN 500 MG/1
500 TABLET ORAL EVERY 8 HOURS
Qty: 21 | Refills: 0 | Status: ACTIVE | COMMUNITY
Start: 2024-09-17 | End: 1900-01-01

## 2024-09-17 RX ORDER — OXYCODONE AND ACETAMINOPHEN 7.5; 325 MG/1; MG/1
1 TABLET ORAL
Qty: 10 | Refills: 0
Start: 2024-09-17

## 2024-09-17 RX ORDER — CEPHALEXIN 500 MG
1 CAPSULE ORAL
Qty: 28 | Refills: 0
Start: 2024-09-17 | End: 2024-09-23

## 2024-09-17 NOTE — HISTORY OF PRESENT ILLNESS
[FreeTextEntry1] : 28M presenting with a previously infected pilonidal cyst requiring drainage now s/p Gips pilonidal debridement, POD11 with considerable pain, and bleeding from the wound. Denies fevers, chills, purulent drainage. No change in BM or urination. Did develop a swollen and tender Left inguinal node.   PMH: Denies Meds: Truvada for Prep All: NKDA PSH: Back and knee surgery FH: Denies CRC/IBD Cscope: Never

## 2024-09-17 NOTE — PHYSICAL EXAM
[Abdomen Tenderness] : ~T No ~M abdominal tenderness [JVD] : no jugular venous distention  [Normal Breath Sounds] : Normal breath sounds [Alert] : alert [Calm] : calm [de-identified] : Well appearing male in NAD [de-identified] : MMM [de-identified] : ROM WNL, Left groin tender lymphadenopathy [FreeTextEntry1] : The pt was examined in the prone myah knife position with a medical assistant present for the entirety of the examination. Visual examination of the intergluteal cleft with effacement of the buttocks revealed two 5mm surgical wounds in the superior cleft with inflammation but without purulence or concern for active infection. Silver nitrate applied to underlying granulation tissue. Otherwise no masses, ulcerations, fissures or skin rashes.  The patient tolerated the exam well.

## 2024-09-17 NOTE — PHYSICAL EXAM
[Abdomen Tenderness] : ~T No ~M abdominal tenderness [JVD] : no jugular venous distention  [Normal Breath Sounds] : Normal breath sounds [Alert] : alert [Calm] : calm [de-identified] : Well appearing male in NAD [de-identified] : MMM [de-identified] : ROM WNL, Left groin tender lymphadenopathy [FreeTextEntry1] : The pt was examined in the prone myah knife position with a medical assistant present for the entirety of the examination. Visual examination of the intergluteal cleft with effacement of the buttocks revealed two 5mm surgical wounds in the superior cleft with inflammation but without purulence or concern for active infection. Silver nitrate applied to underlying granulation tissue. Otherwise no masses, ulcerations, fissures or skin rashes.  The patient tolerated the exam well.

## 2024-10-03 ENCOUNTER — APPOINTMENT (OUTPATIENT)
Dept: COLORECTAL SURGERY | Facility: CLINIC | Age: 29
End: 2024-10-03
Payer: COMMERCIAL

## 2024-10-03 VITALS
TEMPERATURE: 98 F | HEART RATE: 69 BPM | SYSTOLIC BLOOD PRESSURE: 129 MMHG | DIASTOLIC BLOOD PRESSURE: 82 MMHG | BODY MASS INDEX: 21.22 KG/M2 | WEIGHT: 140 LBS | HEIGHT: 68 IN

## 2024-10-03 DIAGNOSIS — L05.91 PILONIDAL CYST W/OUT ABSCESS: ICD-10-CM

## 2024-10-03 PROCEDURE — 99024 POSTOP FOLLOW-UP VISIT: CPT

## 2024-10-03 NOTE — PHYSICAL EXAM
[Abdomen Tenderness] : ~T No ~M abdominal tenderness [JVD] : no jugular venous distention  [Normal Breath Sounds] : Normal breath sounds [Alert] : alert [Calm] : calm [de-identified] : Well appearing male in NAD [de-identified] : MMM [de-identified] : ROM WNL [FreeTextEntry1] : Visual examination of the intergluteal cleft with effacement of the buttocks revealed two 5mm surgical wounds in the superior cleft without inflammation and without purulence or concern for active infection. The lateral wound has since re-epithelialized and the midline wound has a shallow granulation wound bed. Silver nitrate applied to underlying granulation tissue and of note there was no tunneling or tracking beneath the wound. Otherwise no masses, ulcerations, fissures or skin rashes.  The patient tolerated the exam well.

## 2024-10-03 NOTE — PHYSICAL EXAM
[Abdomen Tenderness] : ~T No ~M abdominal tenderness [JVD] : no jugular venous distention  [Normal Breath Sounds] : Normal breath sounds [Alert] : alert [Calm] : calm [de-identified] : Well appearing male in NAD [de-identified] : MMM [de-identified] : ROM WNL [FreeTextEntry1] : Visual examination of the intergluteal cleft with effacement of the buttocks revealed two 5mm surgical wounds in the superior cleft without inflammation and without purulence or concern for active infection. The lateral wound has since re-epithelialized and the midline wound has a shallow granulation wound bed. Silver nitrate applied to underlying granulation tissue and of note there was no tunneling or tracking beneath the wound. Otherwise no masses, ulcerations, fissures or skin rashes.  The patient tolerated the exam well.

## 2024-10-03 NOTE — HISTORY OF PRESENT ILLNESS
[FreeTextEntry1] : 28M presenting with a previously infected pilonidal cyst requiring drainage now s/p Gips pilonidal debridement, nearly 4 weeks post op. He was seen 2 weeks prior with a robust inflammatory response and was prescribed oral abx and supportive care. Today he presents for interval follow up and reports he was unable to complete the abx due to GI upset however the inflammation, pain and drainage has all since subsided. He reports the prior groin lymphadenopathy has also resolved. Denies fevers, chills.  PMH: Denies Meds: Truvada for Prep All: NKDA PSH: Back and knee surgery FH: Denies CRC/IBD Cscope: Never

## 2024-10-03 NOTE — ASSESSMENT
[FreeTextEntry1] : 28M with a pilonidal cyst now s/p Gips procedure, postoperatively with robust inflammatory response, has since settled down and healing well.

## 2024-10-03 NOTE — PHYSICAL EXAM
[Abdomen Tenderness] : ~T No ~M abdominal tenderness [JVD] : no jugular venous distention  [Normal Breath Sounds] : Normal breath sounds [Alert] : alert [Calm] : calm [de-identified] : Well appearing male in NAD [de-identified] : MMM [de-identified] : ROM WNL [FreeTextEntry1] : Visual examination of the intergluteal cleft with effacement of the buttocks revealed two 5mm surgical wounds in the superior cleft without inflammation and without purulence or concern for active infection. The lateral wound has since re-epithelialized and the midline wound has a shallow granulation wound bed. Silver nitrate applied to underlying granulation tissue and of note there was no tunneling or tracking beneath the wound. Otherwise no masses, ulcerations, fissures or skin rashes.  The patient tolerated the exam well.

## 2024-10-03 NOTE — REVIEW OF SYSTEMS
What Is The Reason For Today's Visit?: Surveillance against skin cancer recurrences How Many Skin Cancers Have You Had?: more than one Additional History: Patient sun protects and takes a daily vitamin d When Was Your Last Cancer Diagnosed?: 6/2016 [Negative] : Heme/Lymph

## 2024-10-17 ENCOUNTER — APPOINTMENT (OUTPATIENT)
Dept: COLORECTAL SURGERY | Facility: CLINIC | Age: 29
End: 2024-10-17
Payer: COMMERCIAL

## 2024-10-17 VITALS
WEIGHT: 140 LBS | SYSTOLIC BLOOD PRESSURE: 132 MMHG | BODY MASS INDEX: 21.22 KG/M2 | HEIGHT: 68 IN | HEART RATE: 64 BPM | TEMPERATURE: 64 F | DIASTOLIC BLOOD PRESSURE: 71 MMHG

## 2024-10-17 DIAGNOSIS — L05.91 PILONIDAL CYST W/OUT ABSCESS: ICD-10-CM

## 2024-10-17 PROCEDURE — 99024 POSTOP FOLLOW-UP VISIT: CPT

## (undated) DEVICE — PUNCH BIOPSY 4MM DISP

## (undated) DEVICE — DRSG TELFA 3 X 8

## (undated) DEVICE — NDL HYPO SAFE 22G X 1.5" (BLACK)

## (undated) DEVICE — LUBRICATING JELLY ONESHOT 1.25OZ

## (undated) DEVICE — DRAPE TOWEL BLUE 17" X 24"

## (undated) DEVICE — PREP BETADINE KIT

## (undated) DEVICE — ELCTR PENCIL SMOKE EVACUATOR COATED PUSH BUTTON 70MM

## (undated) DEVICE — DRAPE 1/2 SHEET 40X57"

## (undated) DEVICE — SUT SILK 0 30" SH

## (undated) DEVICE — SYR ASEPTO

## (undated) DEVICE — GOWN ROYAL SILK XL

## (undated) DEVICE — VESSEL LOOP MAXI-WHITE 0.120" X 16"

## (undated) DEVICE — VENODYNE/SCD SLEEVE CALF MEDIUM

## (undated) DEVICE — ELCTR BOVIE TIP BLADE INSULATED 2.8" EDGE WITH SAFETY

## (undated) DEVICE — PACK COLO RECTAL

## (undated) DEVICE — GLV 7.5 PROTEXIS (WHITE)

## (undated) DEVICE — DRAPE MEDIUM SHEET 44" X 70"

## (undated) DEVICE — PUNCH BIOPSY 5MM DISP

## (undated) DEVICE — WARMING BLANKET UPPER ADULT